# Patient Record
Sex: FEMALE | URBAN - METROPOLITAN AREA
[De-identification: names, ages, dates, MRNs, and addresses within clinical notes are randomized per-mention and may not be internally consistent; named-entity substitution may affect disease eponyms.]

---

## 2023-05-19 ENCOUNTER — OFFICE VISIT (OUTPATIENT)
Dept: URBAN - METROPOLITAN AREA CLINIC 92 | Facility: CLINIC | Age: 43
End: 2023-05-19
Payer: MEDICAID

## 2023-05-19 VITALS
HEIGHT: 70 IN | DIASTOLIC BLOOD PRESSURE: 90 MMHG | SYSTOLIC BLOOD PRESSURE: 129 MMHG | WEIGHT: 191 LBS | HEART RATE: 74 BPM | BODY MASS INDEX: 27.35 KG/M2 | TEMPERATURE: 96.5 F

## 2023-05-19 DIAGNOSIS — Z12.11 COLON CANCER SCREENING: ICD-10-CM

## 2023-05-19 DIAGNOSIS — R14.0 ABDOMINAL BLOATING: ICD-10-CM

## 2023-05-19 DIAGNOSIS — K21.9 GASTROESOPHAGEAL REFLUX DISEASE WITHOUT ESOPHAGITIS: ICD-10-CM

## 2023-05-19 PROBLEM — 266435005: Status: ACTIVE | Noted: 2023-05-19

## 2023-05-19 PROCEDURE — 99204 OFFICE O/P NEW MOD 45 MIN: CPT

## 2023-05-19 RX ORDER — FAMOTIDINE 20 MG/1
1 TABLET AT BEDTIME AS NEEDED TABLET, FILM COATED ORAL ONCE A DAY
Qty: 90 | Refills: 1 | OUTPATIENT
Start: 2023-05-19

## 2023-05-19 NOTE — HPI-TODAY'S VISIT:
43-year-old female presents today with complaints of reflux, bloating and a colonoscopy screening.  She states that for several years she has been having issues with increased bloating and gassiness.  She states that her stomach is always rumbling and she has increased gas with anything that she consumes.  She has bowel movements 2-4 times a day and sometimes these will be loose depending on what she eats.  This has been going on for many years.  She denies any melena, hematochezia, weight loss.  No family history of any GI cancers. She also notes increased belching and GERD symptoms that occur several times a week,worse at night.  She has nausea that is associated with dizziness and is currently on medication for this unknown which 1.  She denies any vomiting, dysphagia, odynophagia.  She does use naproxen as needed for joint pain. She has annual labs with PCP most recently few months ago. Normal per pt.

## 2023-05-22 LAB
IMMUNOGLOBULIN A, QN, SERUM: 278
INTERPRETATION: (no result)
T-TRANSGLUTAMINASE (TTG) IGA: <1

## 2023-05-25 ENCOUNTER — OFFICE VISIT (OUTPATIENT)
Dept: URBAN - METROPOLITAN AREA SURGERY CENTER 16 | Facility: SURGERY CENTER | Age: 43
End: 2023-05-25
Payer: MEDICAID

## 2023-05-25 DIAGNOSIS — K63.89 APPENDICITIS EPIPLOICA: ICD-10-CM

## 2023-05-25 DIAGNOSIS — Z12.11 COLON CANCER SCREENING: ICD-10-CM

## 2023-05-25 PROCEDURE — G8907 PT DOC NO EVENTS ON DISCHARG: HCPCS | Performed by: INTERNAL MEDICINE

## 2023-05-25 PROCEDURE — 45380 COLONOSCOPY AND BIOPSY: CPT | Performed by: INTERNAL MEDICINE

## 2023-06-26 ENCOUNTER — TELEPHONE ENCOUNTER (OUTPATIENT)
Dept: URBAN - METROPOLITAN AREA CLINIC 92 | Facility: CLINIC | Age: 43
End: 2023-06-26

## 2023-06-27 ENCOUNTER — OFFICE VISIT (OUTPATIENT)
Dept: URBAN - METROPOLITAN AREA CLINIC 92 | Facility: CLINIC | Age: 43
End: 2023-06-27
Payer: MEDICAID

## 2023-06-27 ENCOUNTER — DASHBOARD ENCOUNTERS (OUTPATIENT)
Age: 43
End: 2023-06-27

## 2023-06-27 VITALS
HEART RATE: 76 BPM | WEIGHT: 189 LBS | TEMPERATURE: 97.3 F | BODY MASS INDEX: 27.06 KG/M2 | SYSTOLIC BLOOD PRESSURE: 152 MMHG | DIASTOLIC BLOOD PRESSURE: 95 MMHG | HEIGHT: 70 IN

## 2023-06-27 DIAGNOSIS — R14.0 ABDOMINAL BLOATING: ICD-10-CM

## 2023-06-27 DIAGNOSIS — K21.9 GASTROESOPHAGEAL REFLUX DISEASE WITHOUT ESOPHAGITIS: ICD-10-CM

## 2023-06-27 DIAGNOSIS — Z86.010 HISTORY OF COLON POLYPS: ICD-10-CM

## 2023-06-27 PROBLEM — 428283002: Status: ACTIVE | Noted: 2023-06-27

## 2023-06-27 PROCEDURE — 99213 OFFICE O/P EST LOW 20 MIN: CPT

## 2023-06-27 RX ORDER — FAMOTIDINE 20 MG/1
1 TABLET AT BEDTIME AS NEEDED TABLET, FILM COATED ORAL ONCE A DAY
Qty: 90 | Refills: 1 | Status: ACTIVE | COMMUNITY
Start: 2023-05-19

## 2023-06-27 NOTE — HPI-TODAY'S VISIT:
43-year-old female presents today for colonoscopy f/u.   She had a colonoscopy 5- demonstrated fair prep, dimunitive polyp that was lost. Recommend repeat in 5 years. She has 3-4 Bm daily with no hematochezia, melena, weight loss.  She also c/o gas and bloating and states that this is better but still notes this with dairy products.  She c/o GERD and was given pepcid 20mg QHS which she only took for a week. She notes since she changed her diet her sx are much better. She denies any vomiting, dysphagia, odynophagia.  She does use naproxen as needed for joint pain.  No family history of any GI cancers. She has annual labs with PCP most recently few months ago. Normal per pt. Celiac panel last visit was normal.

## 2024-08-14 ENCOUNTER — OFFICE VISIT (OUTPATIENT)
Dept: URBAN - METROPOLITAN AREA CLINIC 92 | Facility: CLINIC | Age: 44
End: 2024-08-14

## 2024-09-13 ENCOUNTER — OFFICE VISIT (OUTPATIENT)
Dept: URBAN - METROPOLITAN AREA CLINIC 92 | Facility: CLINIC | Age: 44
End: 2024-09-13
Payer: COMMERCIAL

## 2024-09-13 VITALS
DIASTOLIC BLOOD PRESSURE: 84 MMHG | WEIGHT: 200.6 LBS | SYSTOLIC BLOOD PRESSURE: 133 MMHG | BODY MASS INDEX: 28.72 KG/M2 | HEIGHT: 70 IN | TEMPERATURE: 97.4 F | HEART RATE: 82 BPM

## 2024-09-13 DIAGNOSIS — R14.0 ABDOMINAL BLOATING: ICD-10-CM

## 2024-09-13 DIAGNOSIS — K21.9 GASTROESOPHAGEAL REFLUX DISEASE WITHOUT ESOPHAGITIS: ICD-10-CM

## 2024-09-13 DIAGNOSIS — Z86.010 HISTORY OF COLON POLYPS: ICD-10-CM

## 2024-09-13 PROCEDURE — 99213 OFFICE O/P EST LOW 20 MIN: CPT | Performed by: INTERNAL MEDICINE

## 2024-09-13 RX ORDER — FAMOTIDINE 20 MG/1
1 TABLET AT BEDTIME AS NEEDED TABLET, FILM COATED ORAL ONCE A DAY
Qty: 90 | Refills: 1 | Status: ACTIVE | COMMUNITY
Start: 2023-05-19

## 2024-09-13 NOTE — HPI-TODAY'S VISIT:
43-year-old female presents today for colonoscopy f/u.   She had a colonoscopy 5- demonstrated fair prep, dimunitive polyp that was lost. Recommend repeat in 5 years. She has 3-4 Bm daily with no hematochezia, melena, weight loss.  She also c/o gas and bloating and states that this is better but still notes this with dairy products.  She c/o GERD and was given pepcid 20mg QHS which she only took for a week. She notes since she changed her diet her sx are much better. She denies any vomiting, dysphagia, odynophagia.  She does use naproxen as needed for joint pain.  No family history of any GI cancers. She has annual labs with PCP most recently few months ago. Normal per pt. Celiac panel last visit was normal.  ***9/2024: Regular BMs, notes rare mucus. She is perimenopausal, gaining weight. Notes some gas and bloating at times. MInimizing dairy. No CRC in the fam. No dysphagia, no melena.